# Patient Record
Sex: MALE | Race: WHITE | Employment: UNEMPLOYED | URBAN - METROPOLITAN AREA
[De-identification: names, ages, dates, MRNs, and addresses within clinical notes are randomized per-mention and may not be internally consistent; named-entity substitution may affect disease eponyms.]

---
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
## 2025-04-01 ENCOUNTER — OFFICE VISIT (OUTPATIENT)
Dept: URGENT CARE | Age: 20
End: 2025-04-01
Payer: COMMERCIAL

## 2025-04-01 VITALS
RESPIRATION RATE: 18 BRPM | SYSTOLIC BLOOD PRESSURE: 147 MMHG | HEART RATE: 125 BPM | TEMPERATURE: 98.1 F | WEIGHT: 315 LBS | HEIGHT: 71 IN | BODY MASS INDEX: 44.1 KG/M2 | DIASTOLIC BLOOD PRESSURE: 91 MMHG | OXYGEN SATURATION: 98 %

## 2025-04-01 DIAGNOSIS — B27.90 ACUTE PHARYNGITIS DUE TO INFECTIOUS MONONUCLEOSIS: ICD-10-CM

## 2025-04-01 DIAGNOSIS — J02.9 SORE THROAT: Primary | ICD-10-CM

## 2025-04-01 LAB
POC RAPID MONO: POSITIVE
POC RAPID STREP: POSITIVE

## 2025-04-01 PROCEDURE — 3008F BODY MASS INDEX DOCD: CPT | Performed by: NURSE PRACTITIONER

## 2025-04-01 PROCEDURE — 1036F TOBACCO NON-USER: CPT | Performed by: NURSE PRACTITIONER

## 2025-04-01 PROCEDURE — 87880 STREP A ASSAY W/OPTIC: CPT | Performed by: NURSE PRACTITIONER

## 2025-04-01 PROCEDURE — 86308 HETEROPHILE ANTIBODY SCREEN: CPT | Performed by: NURSE PRACTITIONER

## 2025-04-01 PROCEDURE — 99203 OFFICE O/P NEW LOW 30 MIN: CPT | Performed by: NURSE PRACTITIONER

## 2025-04-01 RX ORDER — DOXYCYCLINE HYCLATE 100 MG
100 TABLET ORAL 2 TIMES DAILY
Qty: 7 TABLET | Refills: 0 | Status: SHIPPED | OUTPATIENT
Start: 2025-04-01 | End: 2025-04-01

## 2025-04-01 RX ORDER — DOXYCYCLINE HYCLATE 100 MG
100 TABLET ORAL 2 TIMES DAILY
Qty: 14 TABLET | Refills: 0 | Status: SHIPPED | OUTPATIENT
Start: 2025-04-01 | End: 2025-04-08

## 2025-04-01 RX ORDER — PREDNISONE 10 MG/1
TABLET ORAL
Qty: 30 TABLET | Refills: 0 | Status: SHIPPED | OUTPATIENT
Start: 2025-04-01

## 2025-04-01 ASSESSMENT — ENCOUNTER SYMPTOMS
EYES NEGATIVE: 1
HOARSE VOICE: 1
RESPIRATORY NEGATIVE: 1
CARDIOVASCULAR NEGATIVE: 1
CONSTITUTIONAL NEGATIVE: 1
SWOLLEN GLANDS: 1
TROUBLE SWALLOWING: 1
GASTROINTESTINAL NEGATIVE: 1
PSYCHIATRIC NEGATIVE: 1
SORE THROAT: 1
NEUROLOGICAL NEGATIVE: 1

## 2025-04-01 NOTE — PROGRESS NOTES
"Subjective   Patient ID: Jay Cole is a 19 y.o. male. They present today with a chief complaint of Sore Throat (X 3 days complains of sore throat, horse voice, painful swallowing, swollen glands, post nasal drip. Has tried IBU, and Chloraseptic Spray with mild relief.).    History of Present Illness    Sore Throat   This is a new problem. The current episode started in the past 7 days. The problem has been gradually worsening. There has been no fever. Associated symptoms include a hoarse voice, swollen glands and trouble swallowing. He has tried NSAIDs for the symptoms. The treatment provided mild relief.       Past Medical History  Allergies as of 04/01/2025 - Reviewed 04/01/2025   Allergen Reaction Noted    Amoxicillin Hives 03/17/2009       (Not in a hospital admission)       History reviewed. No pertinent past medical history.    History reviewed. No pertinent surgical history.     reports that he has never smoked. He has never been exposed to tobacco smoke. He has never used smokeless tobacco.    Review of Systems  Review of Systems   Constitutional: Negative.    HENT:  Positive for hoarse voice, sore throat and trouble swallowing.    Eyes: Negative.    Respiratory: Negative.     Cardiovascular: Negative.    Gastrointestinal: Negative.    Genitourinary: Negative.    Skin: Negative.    Neurological: Negative.    Psychiatric/Behavioral: Negative.                                    Objective    Vitals:    04/01/25 0929   BP: (!) 147/91   BP Location: Right arm   Patient Position: Sitting   BP Cuff Size: Large adult   Pulse: (!) 125   Resp: 18   Temp: 36.7 °C (98.1 °F)   TempSrc: Temporal   SpO2: 98%   Weight: (!) 159 kg (350 lb)   Height: 1.803 m (5' 11\")     No LMP for male patient.    Physical Exam  Constitutional:       Appearance: Normal appearance.   HENT:      Head: Normocephalic and atraumatic.      Nose: Nose normal.      Mouth/Throat:      Pharynx: Oropharyngeal exudate and posterior oropharyngeal " erythema present.   Cardiovascular:      Rate and Rhythm: Tachycardia present.   Pulmonary:      Effort: Pulmonary effort is normal.      Breath sounds: Normal breath sounds.   Musculoskeletal:         General: Normal range of motion.      Cervical back: Tenderness present.   Skin:     General: Skin is warm and dry.   Neurological:      General: No focal deficit present.      Mental Status: He is alert and oriented to person, place, and time.   Psychiatric:         Mood and Affect: Mood normal.         Behavior: Behavior normal.         Thought Content: Thought content normal.         Procedures    Point of Care Test & Imaging Results from this visit  Results for orders placed or performed in visit on 04/01/25   POCT rapid strep A manually resulted   Result Value Ref Range    POC Rapid Strep Positive (A) Negative   POCT Infectious mononucleosis antibody manually resulted   Result Value Ref Range    POC Rapid Mono Positive (A) Negative      Imaging  No results found.    Cardiology, Vascular, and Other Imaging  No other imaging results found for the past 2 days      Diagnostic study results (if any) were reviewed by BENITEZ Rubio.    Assessment/Plan   Allergies, medications, history, and pertinent labs/EKGs/Imaging reviewed by BENITEZ Rubio.     Medical Decision Making  Exam consistent with exudative pharyngitis/ mononucleosis. No drooling/trismus/able to manage secretions.   Rapid strep in office positive as well has mono spot test.   Prescriptions for doxycyline and low dose prednisone. Will send culture to confirm streptococcal pharyngitis.   Allergy to Amox with hives/neck swelling per patient.   Advised patient if symptoms do not improve or worsen he needs to go to the ER for further eval/ monitoring. Patient agreeable.   Risks, benefits, and alternatives of the medications and treatment plan prescribed today were discussed, and patient expressed understanding. Plan follow up as  discussed or as needed if any worsening symptoms or change in condition. Reinforced red flags including (but not limited to): severe or worsening pain; difficulty swallowing; stiff neck; shortness of breath; coughing or vomiting blood; chest pain; and new or increased fever are indications to go to the Emergency Department.  At time of discharge patient was clinically well-appearing and HDS for outpatient management. The patient was educated regarding diagnosis, supportive care, OTC and Rx medications. Patient was given the opportunity to ask questions prior to discharge.  They verbalized understanding of my discussion of the plans for treatment, expected course, indications to return to  or seek further evaluation in ED, and the need for timely follow up as directed. All questions were answered and the patient verbalized understanding of the plan of care for today.      Orders and Diagnoses  Diagnoses and all orders for this visit:  Sore throat  -     POCT rapid strep A manually resulted  -     POCT Infectious mononucleosis antibody manually resulted  -     Group A Streptococcus, Culture  -     doxycycline (Vibra-Tabs) 100 mg tablet; Take 1 tablet (100 mg) by mouth 2 times a day for 7 days. Take with a full glass of water and do not lie down for at least 30 minutes after.  Acute pharyngitis due to infectious mononucleosis  -     predniSONE (Deltasone) 10 mg tablet; 4 tabs po every day x3 days, then 3 tabs po every day x3 days, then 2 tabs po every day x3 days, then 1 tab po every day x3 days      Medical Admin Record      Patient disposition: Home    Electronically signed by BENITEZ Rubio  10:23 AM

## 2025-04-03 LAB — S PYO THROAT QL CULT: ABNORMAL
